# Patient Record
Sex: FEMALE | URBAN - METROPOLITAN AREA
[De-identification: names, ages, dates, MRNs, and addresses within clinical notes are randomized per-mention and may not be internally consistent; named-entity substitution may affect disease eponyms.]

---

## 2020-07-15 ENCOUNTER — APPOINTMENT (OUTPATIENT)
Age: 30
Setting detail: DERMATOLOGY
End: 2020-07-17

## 2020-07-15 DIAGNOSIS — L40.0 PSORIASIS VULGARIS: ICD-10-CM

## 2020-07-15 DIAGNOSIS — L40.8 OTHER PSORIASIS: ICD-10-CM

## 2020-07-15 PROCEDURE — OTHER PRESCRIPTION: OTHER

## 2020-07-15 PROCEDURE — OTHER COUNSELING: OTHER

## 2020-07-15 PROCEDURE — OTHER MIPS QUALITY: OTHER

## 2020-07-15 PROCEDURE — 11900 INJECT SKIN LESIONS </W 7: CPT

## 2020-07-15 PROCEDURE — OTHER TREATMENT REGIMEN: OTHER

## 2020-07-15 PROCEDURE — OTHER INTRALESIONAL KENALOG: OTHER

## 2020-07-15 PROCEDURE — 99202 OFFICE O/P NEW SF 15 MIN: CPT | Mod: 25

## 2020-07-15 RX ORDER — FLUOCINONIDE 0.5 MG/G
CREAM TOPICAL
Qty: 1 | Refills: 1 | Status: ERX | COMMUNITY
Start: 2020-07-15

## 2020-07-15 RX ORDER — FLUOCINONIDE 0.5 MG/ML
SOLUTION TOPICAL
Qty: 1 | Refills: 1 | Status: ERX | COMMUNITY
Start: 2020-07-15

## 2020-07-15 RX ORDER — KETOCONAZOLE 20 MG/ML
SHAMPOO, SUSPENSION TOPICAL
Qty: 1 | Refills: 1 | Status: ERX | COMMUNITY
Start: 2020-07-15

## 2020-07-15 ASSESSMENT — LOCATION DETAILED DESCRIPTION DERM
LOCATION DETAILED: LEFT MIDDLE FINGERNAIL
LOCATION DETAILED: LEFT INDEX FINGERNAIL
LOCATION DETAILED: RIGHT SUPERIOR POSTERIOR NECK
LOCATION DETAILED: RIGHT INDEX FINGERNAIL
LOCATION DETAILED: LEFT SMALL FINGERNAIL
LOCATION DETAILED: RIGHT INFERIOR OCCIPITAL SCALP
LOCATION DETAILED: RIGHT MIDDLE FINGERNAIL
LOCATION DETAILED: LEFT MEDIAL FRONTAL SCALP
LOCATION DETAILED: RIGHT THUMBNAIL
LOCATION DETAILED: RIGHT RING FINGER LUNULA
LOCATION DETAILED: LEFT ELBOW
LOCATION DETAILED: LEFT RING FINGERNAIL
LOCATION DETAILED: RIGHT SMALL FINGERNAIL
LOCATION DETAILED: RIGHT SUPERIOR LATERAL NECK
LOCATION DETAILED: MID POSTERIOR NECK
LOCATION DETAILED: LEFT THUMBNAIL

## 2020-07-15 ASSESSMENT — LOCATION ZONE DERM
LOCATION ZONE: NECK
LOCATION ZONE: SCALP
LOCATION ZONE: ARM
LOCATION ZONE: FINGERNAIL

## 2020-07-15 ASSESSMENT — LOCATION SIMPLE DESCRIPTION DERM
LOCATION SIMPLE: LEFT MIDDLE FINGERNAIL
LOCATION SIMPLE: LEFT SMALL FINGERNAIL
LOCATION SIMPLE: LEFT THUMBNAIL
LOCATION SIMPLE: RIGHT SMALL FINGERNAIL
LOCATION SIMPLE: RIGHT THUMBNAIL
LOCATION SIMPLE: LEFT RING FINGERNAIL
LOCATION SIMPLE: POSTERIOR SCALP
LOCATION SIMPLE: RIGHT INDEX FINGERNAIL
LOCATION SIMPLE: LEFT INDEX FINGERNAIL
LOCATION SIMPLE: RIGHT RING FINGERNAIL
LOCATION SIMPLE: RIGHT MIDDLE FINGERNAIL
LOCATION SIMPLE: LEFT SCALP
LOCATION SIMPLE: POSTERIOR NECK
LOCATION SIMPLE: LEFT ELBOW

## 2020-07-15 ASSESSMENT — BSA PSORIASIS: % BODY COVERED IN PSORIASIS: 5

## 2020-07-15 NOTE — PROCEDURE: TREATMENT REGIMEN
Start Regimen: fluocinonide 0.05 % topical cream \\nSig: Apply to the affected areas trunk and extremities twice a day for up 2 weeks per month as needed

## 2020-07-15 NOTE — PROCEDURE: TREATMENT REGIMEN
Start Regimen: ketoconazole 2 % shampoo \\nSig: Lather scalp every other day, let sit for 5 minutes then rinse.\\n\\nfluocinonide 0.05 % topical solution \\nSig: Apply to affected area of scalp twice daily for two weeks as needed. Initiate Regimen: ketoconazole 2 % shampoo \\nSig: Lather scalp every other day, let sit for 5 minutes then rinse.\\n\\nfluocinonide 0.05 % topical solution \\nSig: Apply to affected area of scalp twice daily for two weeks as needed.

## 2020-07-15 NOTE — HPI: RASH (PSORIASIS)
Do You Have A Family History Of Psoriasis?: no
How Severe Is Your Psoriasis?: moderate
Is This A New Presentation, Or A Follow-Up?: Psoriasis
Additional History: Patient was on Otezla for 10 months before stopping and found that it controlled her psoriasis. She discontinued the medication because she was told it was not good to be on due to the COVID virus.

## 2020-07-15 NOTE — PROCEDURE: TREATMENT REGIMEN
Other Instructions: Patient is planning to get pregnant at the end of the year, will treat psoriasis topically only.

## 2020-08-26 ENCOUNTER — APPOINTMENT (OUTPATIENT)
Age: 30
Setting detail: DERMATOLOGY
End: 2020-08-27

## 2020-08-26 DIAGNOSIS — L40.0 PSORIASIS VULGARIS: ICD-10-CM

## 2020-08-26 DIAGNOSIS — L81.0 POSTINFLAMMATORY HYPERPIGMENTATION: ICD-10-CM

## 2020-08-26 DIAGNOSIS — L40.8 OTHER PSORIASIS: ICD-10-CM

## 2020-08-26 PROCEDURE — 11900 INJECT SKIN LESIONS </W 7: CPT

## 2020-08-26 PROCEDURE — OTHER INTRALESIONAL KENALOG: OTHER

## 2020-08-26 PROCEDURE — 99213 OFFICE O/P EST LOW 20 MIN: CPT | Mod: 25

## 2020-08-26 PROCEDURE — OTHER COUNSELING: OTHER

## 2020-08-26 PROCEDURE — OTHER TREATMENT REGIMEN: OTHER

## 2020-08-26 ASSESSMENT — LOCATION SIMPLE DESCRIPTION DERM
LOCATION SIMPLE: LEFT MIDDLE FINGERNAIL
LOCATION SIMPLE: RIGHT RING FINGERNAIL
LOCATION SIMPLE: RIGHT INDEX FINGERNAIL
LOCATION SIMPLE: POSTERIOR SCALP
LOCATION SIMPLE: RIGHT MIDDLE FINGERNAIL
LOCATION SIMPLE: LEFT INDEX FINGERNAIL
LOCATION SIMPLE: LEFT SMALL FINGERNAIL
LOCATION SIMPLE: LEFT THIGH
LOCATION SIMPLE: RIGHT THUMBNAIL
LOCATION SIMPLE: RIGHT SMALL FINGERNAIL
LOCATION SIMPLE: LEFT RING FINGERNAIL
LOCATION SIMPLE: LEFT SCALP
LOCATION SIMPLE: LEFT THUMBNAIL
LOCATION SIMPLE: LEFT ELBOW

## 2020-08-26 ASSESSMENT — LOCATION ZONE DERM
LOCATION ZONE: FINGERNAIL
LOCATION ZONE: LEG
LOCATION ZONE: SCALP
LOCATION ZONE: ARM

## 2020-08-26 ASSESSMENT — LOCATION DETAILED DESCRIPTION DERM
LOCATION DETAILED: LEFT ANTERIOR PROXIMAL THIGH
LOCATION DETAILED: RIGHT INDEX FINGERNAIL
LOCATION DETAILED: RIGHT MIDDLE FINGERNAIL
LOCATION DETAILED: LEFT RING FINGERNAIL
LOCATION DETAILED: RIGHT THUMBNAIL
LOCATION DETAILED: LEFT SMALL FINGERNAIL
LOCATION DETAILED: LEFT MIDDLE FINGERNAIL
LOCATION DETAILED: LEFT THUMBNAIL
LOCATION DETAILED: RIGHT RING FINGER LUNULA
LOCATION DETAILED: RIGHT INFERIOR OCCIPITAL SCALP
LOCATION DETAILED: LEFT ELBOW
LOCATION DETAILED: LEFT INDEX FINGERNAIL
LOCATION DETAILED: LEFT MEDIAL FRONTAL SCALP
LOCATION DETAILED: RIGHT SMALL FINGERNAIL

## 2020-08-26 NOTE — PROCEDURE: TREATMENT REGIMEN
Detail Level: Zone
Action 3: Continue
Other Instructions: Patient is planning to get pregnant at the end of the year, will treat psoriasis topically only.
Continue Regimen: T-Norman Shampoo \\n\\nfluocinonide 0.05 % topical solution \\nSig: Apply to affected area of scalp twice daily for two weeks as needed.
Continue Regimen: fluocinonide 0.05 % topical cream \\nSig: Apply to the affected areas trunk and extremities twice a day for up 2 weeks per month as needed
Plan: Recommend AMBI Fade Cream and strict sun avoidance

## 2021-05-26 ENCOUNTER — APPOINTMENT (OUTPATIENT)
Age: 31
Setting detail: DERMATOLOGY
End: 2021-05-28

## 2021-05-26 DIAGNOSIS — L29.8 OTHER PRURITUS: ICD-10-CM

## 2021-05-26 DIAGNOSIS — Q828 OTHER SPECIFIED ANOMALIES OF SKIN: ICD-10-CM

## 2021-05-26 DIAGNOSIS — L40.0 PSORIASIS VULGARIS: ICD-10-CM

## 2021-05-26 DIAGNOSIS — Q819 OTHER SPECIFIED ANOMALIES OF SKIN: ICD-10-CM

## 2021-05-26 DIAGNOSIS — Q826 OTHER SPECIFIED ANOMALIES OF SKIN: ICD-10-CM

## 2021-05-26 PROBLEM — L85.8 OTHER SPECIFIED EPIDERMAL THICKENING: Status: ACTIVE | Noted: 2021-05-26

## 2021-05-26 PROBLEM — D23.72 OTHER BENIGN NEOPLASM OF SKIN OF LEFT LOWER LIMB, INCLUDING HIP: Status: ACTIVE | Noted: 2021-05-26

## 2021-05-26 PROCEDURE — OTHER COUNSELING: OTHER

## 2021-05-26 PROCEDURE — OTHER OTHER: OTHER

## 2021-05-26 PROCEDURE — OTHER PRESCRIPTION: OTHER

## 2021-05-26 PROCEDURE — 11901 INJECT SKIN LESIONS >7: CPT

## 2021-05-26 PROCEDURE — OTHER MIPS QUALITY: OTHER

## 2021-05-26 PROCEDURE — OTHER INTRALESIONAL KENALOG: OTHER

## 2021-05-26 PROCEDURE — 99213 OFFICE O/P EST LOW 20 MIN: CPT | Mod: 25

## 2021-05-26 PROCEDURE — OTHER TREATMENT REGIMEN: OTHER

## 2021-05-26 RX ORDER — KETOCONAZOLE 20 MG/ML
SHAMPOO, SUSPENSION TOPICAL
Qty: 1 | Refills: 10 | Status: ERX

## 2021-05-26 RX ORDER — CLOBETASOL PROPIONATE 0.5 MG/ML
SOLUTION TOPICAL
Qty: 1 | Refills: 5 | Status: ERX | COMMUNITY
Start: 2021-05-26

## 2021-05-26 ASSESSMENT — LOCATION SIMPLE DESCRIPTION DERM
LOCATION SIMPLE: RIGHT UPPER ARM
LOCATION SIMPLE: POSTERIOR SCALP
LOCATION SIMPLE: LEFT UPPER ARM
LOCATION SIMPLE: ANTERIOR SCALP

## 2021-05-26 ASSESSMENT — LOCATION DETAILED DESCRIPTION DERM
LOCATION DETAILED: LEFT ANTERIOR PROXIMAL UPPER ARM
LOCATION DETAILED: MID-FRONTAL SCALP
LOCATION DETAILED: RIGHT ANTERIOR PROXIMAL UPPER ARM
LOCATION DETAILED: LEFT OCCIPITAL SCALP
LOCATION DETAILED: LEFT INFERIOR OCCIPITAL SCALP
LOCATION DETAILED: RIGHT OCCIPITAL SCALP
LOCATION DETAILED: RIGHT INFERIOR OCCIPITAL SCALP
LOCATION DETAILED: MID-OCCIPITAL SCALP

## 2021-05-26 ASSESSMENT — LOCATION ZONE DERM
LOCATION ZONE: ARM
LOCATION ZONE: SCALP

## 2021-05-26 NOTE — PROCEDURE: INTRALESIONAL KENALOG
X Size Of Lesion In Cm (Optional): 0
Concentration Of Solution Injected (Mg/Ml): 10.0
Medical Necessity Clause: This procedure was medically necessary because the lesions that were treated were:
Include Z78.9 (Other Specified Conditions Influencing Health Status) As An Associated Diagnosis?: No
Total Volume Injected (Ccs- Only Use Numbers And Decimals): 3
Detail Level: Zone
Consent: The risks of atrophy were reviewed with the patient.
Validate Note Data When Using Inventory: Yes
Kenalog Preparation: Kenalog
Administered By (Optional): Tala Arora MD

## 2021-05-26 NOTE — PROCEDURE: OTHER
Detail Level: Zone
Render Risk Assessment In Note?: no
Other (Free Text): Pt was on otezla in the last, she would like to discuss other tx options at next visit.
Note Text (......Xxx Chief Complaint.): This diagnosis correlates with the

## 2021-05-26 NOTE — PROCEDURE: TREATMENT REGIMEN
Plan: Fluocinonide or clobetasol soln. to psoriasis on trunk/extremities  twice daily prn Other Instructions: Fluocinonide or clobetasol soln. to psoriasis on trunk/extremities  twice daily prn

## 2023-04-24 ENCOUNTER — APPOINTMENT (RX ONLY)
Dept: URBAN - METROPOLITAN AREA CLINIC 166 | Facility: CLINIC | Age: 33
Setting detail: DERMATOLOGY
End: 2023-04-24

## 2023-04-24 DIAGNOSIS — L40.0 PSORIASIS VULGARIS: ICD-10-CM

## 2023-04-24 PROCEDURE — 99203 OFFICE O/P NEW LOW 30 MIN: CPT

## 2023-04-24 PROCEDURE — ? TREATMENT REGIMEN

## 2023-04-24 PROCEDURE — ? COUNSELING

## 2023-04-24 PROCEDURE — ? PRESCRIPTION

## 2023-04-24 RX ORDER — TRIAMCINOLONE ACETONIDE 1 MG/G
CREAM TOPICAL
Qty: 30 | Refills: 3 | Status: ERX | COMMUNITY
Start: 2023-04-24

## 2023-04-24 RX ORDER — CALCIPOTRIENE 50 UG/G
OINTMENT TOPICAL
Qty: 120 | Refills: 3 | Status: CANCELLED

## 2023-04-24 RX ORDER — TACROLIMUS 1 MG/G
OINTMENT TOPICAL
Qty: 30 | Refills: 3 | Status: CANCELLED

## 2023-04-24 RX ADMIN — TRIAMCINOLONE ACETONIDE: 1 CREAM TOPICAL at 00:00

## 2023-04-24 ASSESSMENT — LOCATION DETAILED DESCRIPTION DERM
LOCATION DETAILED: RIGHT ANTERIOR MEDIAL PROXIMAL THIGH
LOCATION DETAILED: RIGHT INFERIOR OCCIPITAL SCALP
LOCATION DETAILED: RIGHT LATERAL ABDOMEN
LOCATION DETAILED: LEFT LATERAL TEMPLE
LOCATION DETAILED: SACRAL SPINE
LOCATION DETAILED: LEFT CRUS OF HELIX
LOCATION DETAILED: LEFT LATERAL ABDOMEN

## 2023-04-24 ASSESSMENT — LOCATION SIMPLE DESCRIPTION DERM
LOCATION SIMPLE: RIGHT THIGH
LOCATION SIMPLE: POSTERIOR SCALP
LOCATION SIMPLE: ABDOMEN
LOCATION SIMPLE: LOWER BACK
LOCATION SIMPLE: LEFT TEMPLE
LOCATION SIMPLE: LEFT EAR

## 2023-04-24 ASSESSMENT — LOCATION ZONE DERM
LOCATION ZONE: LEG
LOCATION ZONE: TRUNK
LOCATION ZONE: EAR
LOCATION ZONE: SCALP
LOCATION ZONE: FACE

## 2023-04-24 NOTE — PROCEDURE: TREATMENT REGIMEN
Plan: Pt planning pregnancy actively starting this summer. Recommend stopping Ketoconozole and clobetasol at that time.\\n\\nRefer for light therapy or Cimzia for when she does start trying to become pregnant.
Otc Regimen: Tar shampoo to scalp twice weekly
Initiate Treatment: Apply triamcinolone acetonide 0.1 % topical cream Apply to affected areas on body twice daily x up to 2 weeks at a time. Avoid face and groin\\n\\nConsider (if flares while not pregnant) tacrolimus 0.1 % topical ointment Apply to affected areas of body twice daily x up to 2 weeks at a time.
Detail Level: Zone

## 2023-08-21 ENCOUNTER — APPOINTMENT (RX ONLY)
Dept: URBAN - METROPOLITAN AREA CLINIC 170 | Facility: CLINIC | Age: 33
Setting detail: DERMATOLOGY
End: 2023-08-21

## 2023-08-21 DIAGNOSIS — L40.0 PSORIASIS VULGARIS: ICD-10-CM

## 2023-08-21 PROCEDURE — ? PRESCRIPTION

## 2023-08-21 PROCEDURE — ? COUNSELING

## 2023-08-21 PROCEDURE — 99214 OFFICE O/P EST MOD 30 MIN: CPT

## 2023-08-21 PROCEDURE — ? TREATMENT REGIMEN

## 2023-08-21 PROCEDURE — ? OTHER

## 2023-08-21 RX ORDER — CLOBETASOL PROPIONATE 0.5 MG/ML
SOLUTION TOPICAL
Qty: 50 | Refills: 2 | Status: ERX | COMMUNITY
Start: 2023-08-21

## 2023-08-21 RX ORDER — CLOBETASOL PROPIONATE 0.5 MG/G
CREAM TOPICAL
Qty: 60 | Refills: 2 | Status: ERX | COMMUNITY
Start: 2023-08-21

## 2023-08-21 RX ADMIN — CLOBETASOL PROPIONATE: 0.5 SOLUTION TOPICAL at 00:00

## 2023-08-21 RX ADMIN — CLOBETASOL PROPIONATE: 0.5 CREAM TOPICAL at 00:00

## 2023-08-21 ASSESSMENT — LOCATION ZONE DERM
LOCATION ZONE: LEG
LOCATION ZONE: SCALP
LOCATION ZONE: NECK

## 2023-08-21 ASSESSMENT — LOCATION SIMPLE DESCRIPTION DERM
LOCATION SIMPLE: POSTERIOR NECK
LOCATION SIMPLE: POSTERIOR SCALP
LOCATION SIMPLE: RIGHT THIGH

## 2023-08-21 ASSESSMENT — LOCATION DETAILED DESCRIPTION DERM
LOCATION DETAILED: LEFT SUPERIOR POSTERIOR NECK
LOCATION DETAILED: RIGHT ANTERIOR PROXIMAL THIGH
LOCATION DETAILED: RIGHT INFERIOR OCCIPITAL SCALP

## 2023-08-21 NOTE — PROCEDURE: OTHER
Other (Free Text): trying to get pregnant soon. reviewed options: topicals, light, and cimzia. [place] [facility] [place]. she prefers cream for scalp
Note Text (......Xxx Chief Complaint.): This diagnosis correlates with the
Render Risk Assessment In Note?: no
Detail Level: Zone

## 2023-08-21 NOTE — PROCEDURE: TREATMENT REGIMEN
Action 2: Continue
Discontinue Regimen: Fluocinonide cream and Triamcinolone cream.
Start Regimen: clobetasol 0.05 % topical cream - Apply to affected area of the hip twice a day for 2 weeks per month as needed.\\n\\nclobetasol 0.05 % scalp solution - Apply 5-10 drops to scalp twice daily x 2 weeks per month.
Detail Level: Zone
Other Instructions: We discussed use of light therapy and Cimzia biologic. Contact information provided for [facility]. Informed patient that options for biologic medications are limited due to patients planned upcoming pregnancy.

## 2025-03-04 ENCOUNTER — APPOINTMENT (OUTPATIENT)
Dept: URBAN - METROPOLITAN AREA CLINIC 167 | Facility: CLINIC | Age: 35
Setting detail: DERMATOLOGY
End: 2025-03-04

## 2025-03-04 DIAGNOSIS — L72.3 SEBACEOUS CYST: ICD-10-CM

## 2025-03-04 PROBLEM — D48.5 NEOPLASM OF UNCERTAIN BEHAVIOR OF SKIN: Status: ACTIVE | Noted: 2025-03-04

## 2025-03-04 PROCEDURE — ? INCISION AND DRAINAGE

## 2025-03-04 PROCEDURE — ? TREATMENT REGIMEN

## 2025-03-04 PROCEDURE — 10060 I&D ABSCESS SIMPLE/SINGLE: CPT

## 2025-03-04 ASSESSMENT — LOCATION ZONE DERM: LOCATION ZONE: FACE

## 2025-03-04 ASSESSMENT — LOCATION DETAILED DESCRIPTION DERM: LOCATION DETAILED: LEFT MEDIAL EYEBROW

## 2025-03-04 ASSESSMENT — LOCATION SIMPLE DESCRIPTION DERM: LOCATION SIMPLE: LEFT EYEBROW

## 2025-03-04 NOTE — HPI: EVALUATION OF SKIN LESION(S)
What Type Of Note Output Would You Prefer (Optional)?: Bullet Format
Hpi Title: Evaluation of a Skin Lesion
Additional History: Currently on the 6th day of Keflex.

## 2025-03-04 NOTE — PROCEDURE: INCISION AND DRAINAGE
Detail Level: Simple
Lesion Type: Cyst
Method: 11 blade
Curette: No
Anesthesia Type: 2% lidocaine without epinephrine
Size Of Lesion In Cm (Optional But May Be Required For Some Insurances): 0
Wound Care: Petrolatum
Dressing: dry sterile dressing
Epidermal Sutures: 4-0 Ethilon
Epidermal Closure: simple interrupted
Suture Text: The incision was partially closed with
Preparation Text: The area was prepped in the usual clean fashion.
Curette Text (Optional): After the contents were expressed a curette was used to partially remove the cyst wall.
Medical Necessity Clause: The procedure was medically necessary due to one or more of the following: infection, severe pain, erythema, and warmth. These symptoms are either too severe to respond to conservative measures or have failed conservative measures, and, therefore, procedural intervention is medically indicated
Consent was obtained and risks were reviewed including but not limited to delayed wound healing, infection, need for multiple I and D's, and pain.
Post-Care Instructions: I reviewed with the patient in detail post-care instructions. Patient should keep wound covered and call the office should any redness, pain, swelling or worsening occur.

## 2025-03-12 ENCOUNTER — APPOINTMENT (OUTPATIENT)
Dept: URBAN - METROPOLITAN AREA CLINIC 166 | Facility: CLINIC | Age: 35
Setting detail: DERMATOLOGY
End: 2025-03-12

## 2025-03-12 DIAGNOSIS — L72.8 OTHER FOLLICULAR CYSTS OF THE SKIN AND SUBCUTANEOUS TISSUE: ICD-10-CM

## 2025-03-12 DIAGNOSIS — L40.0 PSORIASIS VULGARIS: ICD-10-CM

## 2025-03-12 PROCEDURE — ? PRESCRIPTION

## 2025-03-12 PROCEDURE — ? OTHER

## 2025-03-12 PROCEDURE — 99213 OFFICE O/P EST LOW 20 MIN: CPT | Mod: 24

## 2025-03-12 PROCEDURE — ? TREATMENT REGIMEN

## 2025-03-12 PROCEDURE — ? COUNSELING

## 2025-03-12 RX ORDER — TRIAMCINOLONE ACETONIDE 1 MG/G
OINTMENT TOPICAL
Qty: 30 | Refills: 3 | Status: ERX | COMMUNITY
Start: 2025-03-12

## 2025-03-12 RX ADMIN — TRIAMCINOLONE ACETONIDE: 1 OINTMENT TOPICAL at 00:00

## 2025-03-12 ASSESSMENT — LOCATION ZONE DERM
LOCATION ZONE: FACE
LOCATION ZONE: LEG

## 2025-03-12 ASSESSMENT — LOCATION DETAILED DESCRIPTION DERM
LOCATION DETAILED: LEFT CENTRAL EYEBROW
LOCATION DETAILED: RIGHT INGUINAL FOLD
LOCATION DETAILED: LEFT MEDIAL THIGH

## 2025-03-12 ASSESSMENT — LOCATION SIMPLE DESCRIPTION DERM
LOCATION SIMPLE: RIGHT INGUINAL FOLD
LOCATION SIMPLE: LEFT EYEBROW
LOCATION SIMPLE: LEFT LOWER EXTREMITY

## 2025-03-12 NOTE — PROCEDURE: TREATMENT REGIMEN
Initiate Treatment: Triamcinalone ointment twice daily for up to two weeks at a time.
Detail Level: Zone

## 2025-03-12 NOTE — PROCEDURE: OTHER
Detail Level: Zone
Note Text (......Xxx Chief Complaint.): This diagnosis correlates with the
Render Risk Assessment In Note?: no
Other (Free Text): Patient has completed a course of antibiotics. No infection present today. I offered patient an ILK injection, and explained the implications during pregnancy. Patient declines treatment today. I discussed that an I&D would not be helpful. Patient is to continue using hot compresses to calm inflammation.

## 2025-03-12 NOTE — HPI: RASH (PSORIASIS)
Is This A New Presentation, Or A Follow-Up?: Psoriasis
Additional History: Patient reports psoriasis was mostly on her scalp in the past and she was able to manage condition with clobetasol scalp solution. Patient reports that since she became pregnant psoriasis has flared and spread to other locations in the armpits, elbow creases, and groin. Patient is hesitant to treat affected areas with topical steroids because she wants to avoid thinning of the skin.

## 2025-03-12 NOTE — HPI: EVALUATION OF SKIN LESION(S)
What Type Of Note Output Would You Prefer (Optional)?: Standard Output
Hpi Title: Evaluation of a Skin Lesion
Additional History: Patient was seen on 3/4/2025 by Dr. Perez for lesion on eyebrow. During the visit Dr. Perez performed an I&D. Patient was also prescribed a course of keflex for lesion during a visit to the emergency room for lesion. Patient would like lesion to be evaluated to ensure healing properly.